# Patient Record
Sex: MALE | Race: WHITE | Employment: FULL TIME | URBAN - METROPOLITAN AREA
[De-identification: names, ages, dates, MRNs, and addresses within clinical notes are randomized per-mention and may not be internally consistent; named-entity substitution may affect disease eponyms.]

---

## 2020-01-16 ENCOUNTER — HOSPITAL ENCOUNTER (OUTPATIENT)
Dept: PREADMISSION TESTING | Age: 65
Discharge: HOME | End: 2020-01-16
Payer: COMMERCIAL

## 2020-01-16 VITALS
SYSTOLIC BLOOD PRESSURE: 156 MMHG | DIASTOLIC BLOOD PRESSURE: 79 MMHG | TEMPERATURE: 97.9 F | HEART RATE: 84 BPM | OXYGEN SATURATION: 98 % | HEIGHT: 67 IN | BODY MASS INDEX: 35.96 KG/M2 | WEIGHT: 229.13 LBS

## 2020-01-16 LAB
ABO + RH BLD: NORMAL
ANION GAP SERPL CALC-SCNC: 5 MMOL/L (ref 5–15)
APPEARANCE UR: CLEAR
ATRIAL RATE: 85 BPM
BACTERIA URNS QL MICRO: NEGATIVE /HPF
BILIRUB UR QL: NEGATIVE
BLOOD GROUP ANTIBODIES SERPL: NORMAL
BUN SERPL-MCNC: 28 MG/DL (ref 6–20)
BUN/CREAT SERPL: 31 (ref 12–20)
CALCIUM SERPL-MCNC: 10.2 MG/DL (ref 8.5–10.1)
CALCULATED P AXIS, ECG09: 39 DEGREES
CALCULATED R AXIS, ECG10: -22 DEGREES
CALCULATED T AXIS, ECG11: 39 DEGREES
CHLORIDE SERPL-SCNC: 106 MMOL/L (ref 97–108)
CO2 SERPL-SCNC: 28 MMOL/L (ref 21–32)
COLOR UR: ABNORMAL
CREAT SERPL-MCNC: 0.89 MG/DL (ref 0.7–1.3)
DIAGNOSIS, 93000: NORMAL
EPITH CASTS URNS QL MICRO: ABNORMAL /LPF
ERYTHROCYTE [DISTWIDTH] IN BLOOD BY AUTOMATED COUNT: 12.9 % (ref 11.5–14.5)
EST. AVERAGE GLUCOSE BLD GHB EST-MCNC: 166 MG/DL
GLUCOSE SERPL-MCNC: 196 MG/DL (ref 65–100)
GLUCOSE UR STRIP.AUTO-MCNC: 500 MG/DL
HBA1C MFR BLD: 7.4 % (ref 4–5.6)
HCT VFR BLD AUTO: 44.5 % (ref 36.6–50.3)
HGB BLD-MCNC: 14 G/DL (ref 12.1–17)
HGB UR QL STRIP: NEGATIVE
HYALINE CASTS URNS QL MICRO: ABNORMAL /LPF (ref 0–5)
INR PPP: 0.9 (ref 0.9–1.1)
KETONES UR QL STRIP.AUTO: ABNORMAL MG/DL
LEUKOCYTE ESTERASE UR QL STRIP.AUTO: NEGATIVE
MCH RBC QN AUTO: 29.3 PG (ref 26–34)
MCHC RBC AUTO-ENTMCNC: 31.5 G/DL (ref 30–36.5)
MCV RBC AUTO: 93.1 FL (ref 80–99)
NITRITE UR QL STRIP.AUTO: NEGATIVE
NRBC # BLD: 0 K/UL (ref 0–0.01)
NRBC BLD-RTO: 0 PER 100 WBC
P-R INTERVAL, ECG05: 210 MS
PH UR STRIP: 6.5 [PH] (ref 5–8)
PLATELET # BLD AUTO: 227 K/UL (ref 150–400)
PMV BLD AUTO: 10.8 FL (ref 8.9–12.9)
POTASSIUM SERPL-SCNC: 5.4 MMOL/L (ref 3.5–5.1)
PROT UR STRIP-MCNC: NEGATIVE MG/DL
PROTHROMBIN TIME: 9.5 SEC (ref 9–11.1)
Q-T INTERVAL, ECG07: 358 MS
QRS DURATION, ECG06: 82 MS
QTC CALCULATION (BEZET), ECG08: 426 MS
RBC # BLD AUTO: 4.78 M/UL (ref 4.1–5.7)
RBC #/AREA URNS HPF: ABNORMAL /HPF (ref 0–5)
SODIUM SERPL-SCNC: 139 MMOL/L (ref 136–145)
SP GR UR REFRACTOMETRY: 1.03 (ref 1–1.03)
SPECIMEN EXP DATE BLD: NORMAL
UA: UC IF INDICATED,UAUC: ABNORMAL
UROBILINOGEN UR QL STRIP.AUTO: 1 EU/DL (ref 0.2–1)
VENTRICULAR RATE, ECG03: 85 BPM
WBC # BLD AUTO: 6.2 K/UL (ref 4.1–11.1)
WBC URNS QL MICRO: ABNORMAL /HPF (ref 0–4)

## 2020-01-16 PROCEDURE — 85027 COMPLETE CBC AUTOMATED: CPT

## 2020-01-16 PROCEDURE — 85610 PROTHROMBIN TIME: CPT

## 2020-01-16 PROCEDURE — 86900 BLOOD TYPING SEROLOGIC ABO: CPT

## 2020-01-16 PROCEDURE — 83036 HEMOGLOBIN GLYCOSYLATED A1C: CPT

## 2020-01-16 PROCEDURE — 93005 ELECTROCARDIOGRAM TRACING: CPT

## 2020-01-16 PROCEDURE — 80048 BASIC METABOLIC PNL TOTAL CA: CPT

## 2020-01-16 PROCEDURE — 81001 URINALYSIS AUTO W/SCOPE: CPT

## 2020-01-16 RX ORDER — LOSARTAN POTASSIUM 100 MG/1
100 TABLET ORAL DAILY
COMMUNITY

## 2020-01-16 RX ORDER — SIMVASTATIN 40 MG/1
40 TABLET, FILM COATED ORAL
COMMUNITY

## 2020-01-16 RX ORDER — METFORMIN HYDROCHLORIDE 500 MG/1
1000 TABLET ORAL 2 TIMES DAILY WITH MEALS
COMMUNITY

## 2020-01-16 RX ORDER — NAPROXEN SODIUM 220 MG
880 TABLET ORAL
COMMUNITY
End: 2020-01-30

## 2020-01-16 RX ORDER — INSULIN DEGLUDEC 100 U/ML
16 INJECTION, SOLUTION SUBCUTANEOUS
COMMUNITY

## 2020-01-16 NOTE — PERIOP NOTES
Preoperative instructions reviewed with patient. Patient given two bottles CHG soap. Instructions  to be reviewed in class on use of CHG soap. Patient given SSI infection sheet. MRSA/MSSA treatment instruction sheet given with an explanation to patient that they  will be notified if treatment instructions need to be initiated. Patient was given the opportunity to ask questions on the information provided.

## 2020-01-17 LAB
BACTERIA SPEC CULT: NORMAL
BACTERIA SPEC CULT: NORMAL
SERVICE CMNT-IMP: NORMAL

## 2020-01-23 RX ORDER — ACETAMINOPHEN 500 MG
1000 TABLET ORAL ONCE
Status: CANCELLED | OUTPATIENT
Start: 2020-01-29 | End: 2020-01-29

## 2020-01-23 RX ORDER — DEXAMETHASONE SODIUM PHOSPHATE 10 MG/ML
4 INJECTION INTRAMUSCULAR; INTRAVENOUS ONCE
Status: CANCELLED | OUTPATIENT
Start: 2020-01-29 | End: 2020-01-29

## 2020-01-23 RX ORDER — CELECOXIB 200 MG/1
200 CAPSULE ORAL ONCE
Status: CANCELLED | OUTPATIENT
Start: 2020-01-29 | End: 2020-01-29

## 2020-01-23 RX ORDER — PREGABALIN 75 MG/1
75 CAPSULE ORAL ONCE
Status: CANCELLED | OUTPATIENT
Start: 2020-01-29 | End: 2020-01-29

## 2020-01-23 RX ORDER — CEFAZOLIN SODIUM/WATER 2 G/20 ML
2 SYRINGE (ML) INTRAVENOUS ONCE
Status: CANCELLED | OUTPATIENT
Start: 2020-01-29 | End: 2020-01-29

## 2020-01-29 ENCOUNTER — ANESTHESIA EVENT (OUTPATIENT)
Dept: SURGERY | Age: 65
End: 2020-01-29
Payer: COMMERCIAL

## 2020-01-29 ENCOUNTER — ANESTHESIA (OUTPATIENT)
Dept: SURGERY | Age: 65
End: 2020-01-29
Payer: COMMERCIAL

## 2020-01-29 ENCOUNTER — HOSPITAL ENCOUNTER (OUTPATIENT)
Age: 65
Setting detail: OBSERVATION
Discharge: HOME | End: 2020-01-30
Attending: ORTHOPAEDIC SURGERY | Admitting: ORTHOPAEDIC SURGERY
Payer: COMMERCIAL

## 2020-01-29 ENCOUNTER — APPOINTMENT (OUTPATIENT)
Dept: GENERAL RADIOLOGY | Age: 65
End: 2020-01-29
Attending: ORTHOPAEDIC SURGERY
Payer: COMMERCIAL

## 2020-01-29 DIAGNOSIS — M16.11 PRIMARY OSTEOARTHRITIS OF RIGHT HIP: Primary | ICD-10-CM

## 2020-01-29 LAB
GLUCOSE BLD STRIP.AUTO-MCNC: 156 MG/DL (ref 65–100)
GLUCOSE BLD STRIP.AUTO-MCNC: 160 MG/DL (ref 65–100)
GLUCOSE BLD STRIP.AUTO-MCNC: 226 MG/DL (ref 65–100)
GLUCOSE BLD STRIP.AUTO-MCNC: 267 MG/DL (ref 65–100)
SERVICE CMNT-IMP: ABNORMAL

## 2020-01-29 PROCEDURE — 74011000258 HC RX REV CODE- 258: Performed by: ORTHOPAEDIC SURGERY

## 2020-01-29 PROCEDURE — 74011000250 HC RX REV CODE- 250: Performed by: NURSE ANESTHETIST, CERTIFIED REGISTERED

## 2020-01-29 PROCEDURE — 97161 PT EVAL LOW COMPLEX 20 MIN: CPT

## 2020-01-29 PROCEDURE — 77030012935 HC DRSG AQUACEL BMS -B: Performed by: ORTHOPAEDIC SURGERY

## 2020-01-29 PROCEDURE — 77030018846 HC SOL IRR STRL H20 ICUM -A: Performed by: ORTHOPAEDIC SURGERY

## 2020-01-29 PROCEDURE — 77030020788: Performed by: ORTHOPAEDIC SURGERY

## 2020-01-29 PROCEDURE — 77030020268 HC MISC GENERAL SUPPLY: Performed by: ORTHOPAEDIC SURGERY

## 2020-01-29 PROCEDURE — 76010000171 HC OR TIME 2 TO 2.5 HR INTENSV-TIER 1: Performed by: ORTHOPAEDIC SURGERY

## 2020-01-29 PROCEDURE — 77030002933 HC SUT MCRYL J&J -A: Performed by: ORTHOPAEDIC SURGERY

## 2020-01-29 PROCEDURE — 77030010507 HC ADH SKN DERMBND J&J -B: Performed by: ORTHOPAEDIC SURGERY

## 2020-01-29 PROCEDURE — C1776 JOINT DEVICE (IMPLANTABLE): HCPCS | Performed by: ORTHOPAEDIC SURGERY

## 2020-01-29 PROCEDURE — 74011250637 HC RX REV CODE- 250/637: Performed by: ORTHOPAEDIC SURGERY

## 2020-01-29 PROCEDURE — 77030018836 HC SOL IRR NACL ICUM -A: Performed by: ORTHOPAEDIC SURGERY

## 2020-01-29 PROCEDURE — 65270000029 HC RM PRIVATE

## 2020-01-29 PROCEDURE — 77030019905 HC CATH URETH INTMIT MDII -A: Performed by: ORTHOPAEDIC SURGERY

## 2020-01-29 PROCEDURE — 73501 X-RAY EXAM HIP UNI 1 VIEW: CPT

## 2020-01-29 PROCEDURE — 77030018547 HC SUT ETHBND1 J&J -B: Performed by: ORTHOPAEDIC SURGERY

## 2020-01-29 PROCEDURE — 77030035236 HC SUT PDS STRATFX BARB J&J -B: Performed by: ORTHOPAEDIC SURGERY

## 2020-01-29 PROCEDURE — 77030007866 HC KT SPN ANES BBMI -B: Performed by: ANESTHESIOLOGY

## 2020-01-29 PROCEDURE — 77030010938 HC CLP LIG TELE -A: Performed by: ORTHOPAEDIC SURGERY

## 2020-01-29 PROCEDURE — 74011000250 HC RX REV CODE- 250: Performed by: ORTHOPAEDIC SURGERY

## 2020-01-29 PROCEDURE — 77030011640 HC PAD GRND REM COVD -A: Performed by: ORTHOPAEDIC SURGERY

## 2020-01-29 PROCEDURE — 74011250636 HC RX REV CODE- 250/636: Performed by: ANESTHESIOLOGY

## 2020-01-29 PROCEDURE — 76210000006 HC OR PH I REC 0.5 TO 1 HR: Performed by: ORTHOPAEDIC SURGERY

## 2020-01-29 PROCEDURE — 74011636637 HC RX REV CODE- 636/637: Performed by: ORTHOPAEDIC SURGERY

## 2020-01-29 PROCEDURE — 97116 GAIT TRAINING THERAPY: CPT

## 2020-01-29 PROCEDURE — C9290 INJ, BUPIVACAINE LIPOSOME: HCPCS | Performed by: ORTHOPAEDIC SURGERY

## 2020-01-29 PROCEDURE — 77030036660

## 2020-01-29 PROCEDURE — 77030011264 HC ELECTRD BLD EXT COVD -A: Performed by: ORTHOPAEDIC SURGERY

## 2020-01-29 PROCEDURE — 74011250636 HC RX REV CODE- 250/636: Performed by: NURSE ANESTHETIST, CERTIFIED REGISTERED

## 2020-01-29 PROCEDURE — 76060000035 HC ANESTHESIA 2 TO 2.5 HR: Performed by: ORTHOPAEDIC SURGERY

## 2020-01-29 PROCEDURE — 77030005513 HC CATH URETH FOL11 MDII -B: Performed by: ORTHOPAEDIC SURGERY

## 2020-01-29 PROCEDURE — 99218 HC RM OBSERVATION: CPT

## 2020-01-29 PROCEDURE — 77030040922 HC BLNKT HYPOTHRM STRY -A

## 2020-01-29 PROCEDURE — 74011250636 HC RX REV CODE- 250/636: Performed by: ORTHOPAEDIC SURGERY

## 2020-01-29 PROCEDURE — 77030006802 HC BLD SAW RECIP BRSM -B: Performed by: ORTHOPAEDIC SURGERY

## 2020-01-29 PROCEDURE — 77030027138 HC INCENT SPIROMETER -A

## 2020-01-29 PROCEDURE — 77030031139 HC SUT VCRL2 J&J -A: Performed by: ORTHOPAEDIC SURGERY

## 2020-01-29 PROCEDURE — 82962 GLUCOSE BLOOD TEST: CPT

## 2020-01-29 PROCEDURE — 97110 THERAPEUTIC EXERCISES: CPT

## 2020-01-29 DEVICE — PINNACLE GRIPTION ACETABULAR SHELL SECTOR 58MM OD
Type: IMPLANTABLE DEVICE | Site: HIP | Status: FUNCTIONAL
Brand: PINNACLE GRIPTION

## 2020-01-29 DEVICE — PINNACLE CANCELLOUS BONE SCREW 6.5MM X 35MM
Type: IMPLANTABLE DEVICE | Site: HIP | Status: FUNCTIONAL
Brand: PINNACLE

## 2020-01-29 DEVICE — COMPONENT TOT HIP PRIMARY CERM ALTRX: Type: IMPLANTABLE DEVICE | Status: FUNCTIONAL

## 2020-01-29 DEVICE — BIOLOX DELTA CERAMIC FEMORAL HEAD +5.0 36MM DIA 12/14 TAPER
Type: IMPLANTABLE DEVICE | Site: HIP | Status: FUNCTIONAL
Brand: BIOLOX DELTA

## 2020-01-29 DEVICE — ACTIS DUOFIX HIP PROSTHESIS (FEMORAL STEM 12/14 TAPER CEMENTLESS SIZE 8 STD COLLAR)  CE
Type: IMPLANTABLE DEVICE | Site: HIP | Status: FUNCTIONAL
Brand: ACTIS

## 2020-01-29 DEVICE — PINNACLE HIP SOLUTIONS ALTRX POLYETHYLENE ACETABULAR LINER NEUTRAL 36MM ID 58MM OD
Type: IMPLANTABLE DEVICE | Site: HIP | Status: FUNCTIONAL
Brand: PINNACLE ALTRX

## 2020-01-29 DEVICE — PINNACLE CANCELLOUS BONE SCREW 6.5MM X 25MM
Type: IMPLANTABLE DEVICE | Site: HIP | Status: FUNCTIONAL
Brand: PINNACLE

## 2020-01-29 RX ORDER — FAMOTIDINE 20 MG/1
20 TABLET, FILM COATED ORAL 2 TIMES DAILY
Qty: 60 TAB | Refills: 0 | Status: SHIPPED | OUTPATIENT
Start: 2020-01-29

## 2020-01-29 RX ORDER — POLYETHYLENE GLYCOL 3350 17 G/17G
17 POWDER, FOR SOLUTION ORAL DAILY
Status: DISCONTINUED | OUTPATIENT
Start: 2020-01-30 | End: 2020-01-30 | Stop reason: HOSPADM

## 2020-01-29 RX ORDER — HYDROMORPHONE HYDROCHLORIDE 1 MG/ML
0.5 INJECTION, SOLUTION INTRAMUSCULAR; INTRAVENOUS; SUBCUTANEOUS
Status: ACTIVE | OUTPATIENT
Start: 2020-01-29 | End: 2020-01-30

## 2020-01-29 RX ORDER — NALOXONE HYDROCHLORIDE 0.4 MG/ML
0.4 INJECTION, SOLUTION INTRAMUSCULAR; INTRAVENOUS; SUBCUTANEOUS AS NEEDED
Status: DISCONTINUED | OUTPATIENT
Start: 2020-01-29 | End: 2020-01-30 | Stop reason: HOSPADM

## 2020-01-29 RX ORDER — SIMVASTATIN 40 MG/1
40 TABLET, FILM COATED ORAL
Status: DISCONTINUED | OUTPATIENT
Start: 2020-01-30 | End: 2020-01-30 | Stop reason: HOSPADM

## 2020-01-29 RX ORDER — TRAMADOL HYDROCHLORIDE 50 MG/1
50 TABLET ORAL
Qty: 60 TAB | Refills: 0 | Status: SHIPPED | OUTPATIENT
Start: 2020-01-29 | End: 2020-02-28

## 2020-01-29 RX ORDER — FAMOTIDINE 20 MG/1
20 TABLET, FILM COATED ORAL 2 TIMES DAILY
Status: DISCONTINUED | OUTPATIENT
Start: 2020-01-29 | End: 2020-01-30 | Stop reason: HOSPADM

## 2020-01-29 RX ORDER — TRAMADOL HYDROCHLORIDE 50 MG/1
50 TABLET ORAL
Status: DISCONTINUED | OUTPATIENT
Start: 2020-01-29 | End: 2020-01-30 | Stop reason: HOSPADM

## 2020-01-29 RX ORDER — MAGNESIUM SULFATE 100 %
4 CRYSTALS MISCELLANEOUS AS NEEDED
Status: DISCONTINUED | OUTPATIENT
Start: 2020-01-29 | End: 2020-01-30 | Stop reason: HOSPADM

## 2020-01-29 RX ORDER — SODIUM CHLORIDE 0.9 % (FLUSH) 0.9 %
5-40 SYRINGE (ML) INJECTION EVERY 8 HOURS
Status: DISCONTINUED | OUTPATIENT
Start: 2020-01-29 | End: 2020-01-30 | Stop reason: HOSPADM

## 2020-01-29 RX ORDER — ACETAMINOPHEN 325 MG/1
650 TABLET ORAL EVERY 6 HOURS
Status: DISCONTINUED | OUTPATIENT
Start: 2020-01-29 | End: 2020-01-30 | Stop reason: HOSPADM

## 2020-01-29 RX ORDER — KETOROLAC TROMETHAMINE 30 MG/ML
15 INJECTION, SOLUTION INTRAMUSCULAR; INTRAVENOUS EVERY 6 HOURS
Status: COMPLETED | OUTPATIENT
Start: 2020-01-29 | End: 2020-01-30

## 2020-01-29 RX ORDER — FENTANYL CITRATE 50 UG/ML
50 INJECTION, SOLUTION INTRAMUSCULAR; INTRAVENOUS AS NEEDED
Status: DISCONTINUED | OUTPATIENT
Start: 2020-01-29 | End: 2020-01-29 | Stop reason: HOSPADM

## 2020-01-29 RX ORDER — ASPIRIN 81 MG/1
81 TABLET ORAL 2 TIMES DAILY
Qty: 60 TAB | Refills: 0 | Status: SHIPPED | OUTPATIENT
Start: 2020-01-29

## 2020-01-29 RX ORDER — INSULIN LISPRO 100 [IU]/ML
INJECTION, SOLUTION INTRAVENOUS; SUBCUTANEOUS
Status: DISCONTINUED | OUTPATIENT
Start: 2020-01-29 | End: 2020-01-30 | Stop reason: HOSPADM

## 2020-01-29 RX ORDER — SODIUM CHLORIDE 0.9 % (FLUSH) 0.9 %
5-40 SYRINGE (ML) INJECTION EVERY 8 HOURS
Status: DISCONTINUED | OUTPATIENT
Start: 2020-01-29 | End: 2020-01-29 | Stop reason: HOSPADM

## 2020-01-29 RX ORDER — PHENYLEPHRINE HCL IN 0.9% NACL 0.4MG/10ML
SYRINGE (ML) INTRAVENOUS AS NEEDED
Status: DISCONTINUED | OUTPATIENT
Start: 2020-01-29 | End: 2020-01-29 | Stop reason: HOSPADM

## 2020-01-29 RX ORDER — SODIUM CHLORIDE, SODIUM LACTATE, POTASSIUM CHLORIDE, CALCIUM CHLORIDE 600; 310; 30; 20 MG/100ML; MG/100ML; MG/100ML; MG/100ML
75 INJECTION, SOLUTION INTRAVENOUS CONTINUOUS
Status: DISCONTINUED | OUTPATIENT
Start: 2020-01-29 | End: 2020-01-29 | Stop reason: HOSPADM

## 2020-01-29 RX ORDER — LOSARTAN POTASSIUM 50 MG/1
100 TABLET ORAL DAILY
Status: DISCONTINUED | OUTPATIENT
Start: 2020-01-30 | End: 2020-01-30 | Stop reason: HOSPADM

## 2020-01-29 RX ORDER — OXYCODONE HYDROCHLORIDE 5 MG/1
5 TABLET ORAL
Qty: 60 TAB | Refills: 0 | Status: SHIPPED | OUTPATIENT
Start: 2020-01-29 | End: 2020-01-30

## 2020-01-29 RX ORDER — PROPOFOL 10 MG/ML
INJECTION, EMULSION INTRAVENOUS AS NEEDED
Status: DISCONTINUED | OUTPATIENT
Start: 2020-01-29 | End: 2020-01-29 | Stop reason: HOSPADM

## 2020-01-29 RX ORDER — SODIUM CHLORIDE 0.9 % (FLUSH) 0.9 %
5-40 SYRINGE (ML) INJECTION AS NEEDED
Status: DISCONTINUED | OUTPATIENT
Start: 2020-01-29 | End: 2020-01-29 | Stop reason: HOSPADM

## 2020-01-29 RX ORDER — CEFAZOLIN SODIUM/WATER 2 G/20 ML
2 SYRINGE (ML) INTRAVENOUS EVERY 8 HOURS
Status: COMPLETED | OUTPATIENT
Start: 2020-01-29 | End: 2020-01-30

## 2020-01-29 RX ORDER — ACETAMINOPHEN 500 MG
1000 TABLET ORAL ONCE
Status: COMPLETED | OUTPATIENT
Start: 2020-01-29 | End: 2020-01-29

## 2020-01-29 RX ORDER — CELECOXIB 200 MG/1
200 CAPSULE ORAL ONCE
Status: COMPLETED | OUTPATIENT
Start: 2020-01-29 | End: 2020-01-29

## 2020-01-29 RX ORDER — FACIAL-BODY WIPES
10 EACH TOPICAL DAILY PRN
Status: DISCONTINUED | OUTPATIENT
Start: 2020-01-30 | End: 2020-01-30 | Stop reason: HOSPADM

## 2020-01-29 RX ORDER — INSULIN GLARGINE 100 [IU]/ML
16 INJECTION, SOLUTION SUBCUTANEOUS
Status: DISCONTINUED | OUTPATIENT
Start: 2020-01-29 | End: 2020-01-30 | Stop reason: HOSPADM

## 2020-01-29 RX ORDER — AMOXICILLIN 250 MG
1 CAPSULE ORAL 2 TIMES DAILY
Status: DISCONTINUED | OUTPATIENT
Start: 2020-01-29 | End: 2020-01-30 | Stop reason: HOSPADM

## 2020-01-29 RX ORDER — OXYCODONE HYDROCHLORIDE 5 MG/1
5 TABLET ORAL
Status: DISCONTINUED | OUTPATIENT
Start: 2020-01-29 | End: 2020-01-30 | Stop reason: HOSPADM

## 2020-01-29 RX ORDER — MELOXICAM 7.5 MG/1
7.5 TABLET ORAL DAILY
Qty: 60 TAB | Refills: 0 | Status: SHIPPED | OUTPATIENT
Start: 2020-01-29

## 2020-01-29 RX ORDER — HYDROMORPHONE HYDROCHLORIDE 1 MG/ML
0.2 INJECTION, SOLUTION INTRAMUSCULAR; INTRAVENOUS; SUBCUTANEOUS
Status: DISCONTINUED | OUTPATIENT
Start: 2020-01-29 | End: 2020-01-29 | Stop reason: HOSPADM

## 2020-01-29 RX ORDER — SODIUM CHLORIDE 9 MG/ML
50 INJECTION, SOLUTION INTRAVENOUS CONTINUOUS
Status: DISCONTINUED | OUTPATIENT
Start: 2020-01-29 | End: 2020-01-29 | Stop reason: HOSPADM

## 2020-01-29 RX ORDER — DEXAMETHASONE SODIUM PHOSPHATE 10 MG/ML
4 INJECTION INTRAMUSCULAR; INTRAVENOUS ONCE
Status: COMPLETED | OUTPATIENT
Start: 2020-01-29 | End: 2020-01-29

## 2020-01-29 RX ORDER — ASPIRIN 81 MG/1
81 TABLET ORAL 2 TIMES DAILY
Status: DISCONTINUED | OUTPATIENT
Start: 2020-01-29 | End: 2020-01-30 | Stop reason: HOSPADM

## 2020-01-29 RX ORDER — MIDAZOLAM HYDROCHLORIDE 1 MG/ML
0.5 INJECTION, SOLUTION INTRAMUSCULAR; INTRAVENOUS
Status: DISCONTINUED | OUTPATIENT
Start: 2020-01-29 | End: 2020-01-29 | Stop reason: HOSPADM

## 2020-01-29 RX ORDER — MORPHINE SULFATE 10 MG/ML
2 INJECTION, SOLUTION INTRAMUSCULAR; INTRAVENOUS
Status: DISCONTINUED | OUTPATIENT
Start: 2020-01-29 | End: 2020-01-29 | Stop reason: HOSPADM

## 2020-01-29 RX ORDER — SODIUM CHLORIDE 0.9 % (FLUSH) 0.9 %
5-40 SYRINGE (ML) INJECTION AS NEEDED
Status: DISCONTINUED | OUTPATIENT
Start: 2020-01-29 | End: 2020-01-30 | Stop reason: HOSPADM

## 2020-01-29 RX ORDER — BUPIVACAINE HYDROCHLORIDE 5 MG/ML
INJECTION, SOLUTION EPIDURAL; INTRACAUDAL AS NEEDED
Status: DISCONTINUED | OUTPATIENT
Start: 2020-01-29 | End: 2020-01-29 | Stop reason: HOSPADM

## 2020-01-29 RX ORDER — ONDANSETRON 2 MG/ML
4 INJECTION INTRAMUSCULAR; INTRAVENOUS AS NEEDED
Status: DISCONTINUED | OUTPATIENT
Start: 2020-01-29 | End: 2020-01-29 | Stop reason: HOSPADM

## 2020-01-29 RX ORDER — METFORMIN HYDROCHLORIDE 500 MG/1
1000 TABLET ORAL 2 TIMES DAILY WITH MEALS
Status: DISCONTINUED | OUTPATIENT
Start: 2020-01-30 | End: 2020-01-30 | Stop reason: HOSPADM

## 2020-01-29 RX ORDER — FENTANYL CITRATE 50 UG/ML
25 INJECTION, SOLUTION INTRAMUSCULAR; INTRAVENOUS
Status: DISCONTINUED | OUTPATIENT
Start: 2020-01-29 | End: 2020-01-29 | Stop reason: HOSPADM

## 2020-01-29 RX ORDER — OXYCODONE AND ACETAMINOPHEN 5; 325 MG/1; MG/1
1 TABLET ORAL AS NEEDED
Status: DISCONTINUED | OUTPATIENT
Start: 2020-01-29 | End: 2020-01-29 | Stop reason: HOSPADM

## 2020-01-29 RX ORDER — PROPOFOL 10 MG/ML
INJECTION, EMULSION INTRAVENOUS
Status: DISCONTINUED | OUTPATIENT
Start: 2020-01-29 | End: 2020-01-29 | Stop reason: HOSPADM

## 2020-01-29 RX ORDER — DEXTROSE MONOHYDRATE 100 MG/ML
0-250 INJECTION, SOLUTION INTRAVENOUS AS NEEDED
Status: DISCONTINUED | OUTPATIENT
Start: 2020-01-29 | End: 2020-01-30 | Stop reason: HOSPADM

## 2020-01-29 RX ORDER — CEFAZOLIN SODIUM/WATER 2 G/20 ML
2 SYRINGE (ML) INTRAVENOUS ONCE
Status: COMPLETED | OUTPATIENT
Start: 2020-01-29 | End: 2020-01-29

## 2020-01-29 RX ORDER — MIDAZOLAM HYDROCHLORIDE 1 MG/ML
INJECTION, SOLUTION INTRAMUSCULAR; INTRAVENOUS AS NEEDED
Status: DISCONTINUED | OUTPATIENT
Start: 2020-01-29 | End: 2020-01-29 | Stop reason: HOSPADM

## 2020-01-29 RX ORDER — DIPHENHYDRAMINE HYDROCHLORIDE 50 MG/ML
12.5 INJECTION, SOLUTION INTRAMUSCULAR; INTRAVENOUS AS NEEDED
Status: DISCONTINUED | OUTPATIENT
Start: 2020-01-29 | End: 2020-01-29 | Stop reason: HOSPADM

## 2020-01-29 RX ORDER — HYDROXYZINE HYDROCHLORIDE 10 MG/1
10 TABLET, FILM COATED ORAL
Status: DISCONTINUED | OUTPATIENT
Start: 2020-01-29 | End: 2020-01-30 | Stop reason: HOSPADM

## 2020-01-29 RX ORDER — MIDAZOLAM HYDROCHLORIDE 1 MG/ML
1 INJECTION, SOLUTION INTRAMUSCULAR; INTRAVENOUS AS NEEDED
Status: DISCONTINUED | OUTPATIENT
Start: 2020-01-29 | End: 2020-01-29 | Stop reason: HOSPADM

## 2020-01-29 RX ORDER — LIDOCAINE HYDROCHLORIDE 10 MG/ML
0.1 INJECTION, SOLUTION EPIDURAL; INFILTRATION; INTRACAUDAL; PERINEURAL AS NEEDED
Status: DISCONTINUED | OUTPATIENT
Start: 2020-01-29 | End: 2020-01-29 | Stop reason: HOSPADM

## 2020-01-29 RX ORDER — PREGABALIN 75 MG/1
75 CAPSULE ORAL ONCE
Status: COMPLETED | OUTPATIENT
Start: 2020-01-29 | End: 2020-01-29

## 2020-01-29 RX ORDER — SODIUM CHLORIDE 9 MG/ML
125 INJECTION, SOLUTION INTRAVENOUS CONTINUOUS
Status: DISPENSED | OUTPATIENT
Start: 2020-01-29 | End: 2020-01-30

## 2020-01-29 RX ORDER — ONDANSETRON 2 MG/ML
4 INJECTION INTRAMUSCULAR; INTRAVENOUS
Status: ACTIVE | OUTPATIENT
Start: 2020-01-29 | End: 2020-01-30

## 2020-01-29 RX ADMIN — FAMOTIDINE 20 MG: 20 TABLET ORAL at 17:08

## 2020-01-29 RX ADMIN — ACETAMINOPHEN 650 MG: 325 TABLET ORAL at 19:27

## 2020-01-29 RX ADMIN — MIDAZOLAM HYDROCHLORIDE 3 MG: 1 INJECTION, SOLUTION INTRAMUSCULAR; INTRAVENOUS at 09:09

## 2020-01-29 RX ADMIN — CELECOXIB 200 MG: 200 CAPSULE ORAL at 07:59

## 2020-01-29 RX ADMIN — Medication 120 MCG: at 10:04

## 2020-01-29 RX ADMIN — SODIUM CHLORIDE, SODIUM LACTATE, POTASSIUM CHLORIDE, AND CALCIUM CHLORIDE 75 ML/HR: 600; 310; 30; 20 INJECTION, SOLUTION INTRAVENOUS at 08:17

## 2020-01-29 RX ADMIN — INSULIN LISPRO 2 UNITS: 100 INJECTION, SOLUTION INTRAVENOUS; SUBCUTANEOUS at 12:00

## 2020-01-29 RX ADMIN — Medication 10 ML: at 14:35

## 2020-01-29 RX ADMIN — SODIUM CHLORIDE 125 ML/HR: 900 INJECTION, SOLUTION INTRAVENOUS at 13:04

## 2020-01-29 RX ADMIN — ACETAMINOPHEN 650 MG: 325 TABLET ORAL at 14:34

## 2020-01-29 RX ADMIN — INSULIN LISPRO 5 UNITS: 100 INJECTION, SOLUTION INTRAVENOUS; SUBCUTANEOUS at 17:08

## 2020-01-29 RX ADMIN — Medication 80 MCG: at 09:42

## 2020-01-29 RX ADMIN — Medication 2 G: at 18:04

## 2020-01-29 RX ADMIN — Medication 2 G: at 09:30

## 2020-01-29 RX ADMIN — PROPOFOL 30 MG: 10 INJECTION, EMULSION INTRAVENOUS at 10:18

## 2020-01-29 RX ADMIN — PROPOFOL 25 MG: 10 INJECTION, EMULSION INTRAVENOUS at 09:37

## 2020-01-29 RX ADMIN — KETOROLAC TROMETHAMINE 15 MG: 30 INJECTION, SOLUTION INTRAMUSCULAR at 18:04

## 2020-01-29 RX ADMIN — Medication 10 ML: at 22:23

## 2020-01-29 RX ADMIN — MIDAZOLAM HYDROCHLORIDE 2 MG: 1 INJECTION, SOLUTION INTRAMUSCULAR; INTRAVENOUS at 09:12

## 2020-01-29 RX ADMIN — TRAMADOL HYDROCHLORIDE 50 MG: 50 TABLET, FILM COATED ORAL at 17:08

## 2020-01-29 RX ADMIN — ASPIRIN 81 MG: 81 TABLET, COATED ORAL at 17:08

## 2020-01-29 RX ADMIN — SODIUM CHLORIDE 125 ML/HR: 900 INJECTION, SOLUTION INTRAVENOUS at 21:25

## 2020-01-29 RX ADMIN — INSULIN GLARGINE 16 UNITS: 100 INJECTION, SOLUTION SUBCUTANEOUS at 22:22

## 2020-01-29 RX ADMIN — PROPOFOL: 10 INJECTION, EMULSION INTRAVENOUS at 10:42

## 2020-01-29 RX ADMIN — DEXAMETHASONE SODIUM PHOSPHATE 4 MG: 10 INJECTION, SOLUTION INTRAMUSCULAR; INTRAVENOUS at 09:54

## 2020-01-29 RX ADMIN — PROPOFOL 25 MG: 10 INJECTION, EMULSION INTRAVENOUS at 09:34

## 2020-01-29 RX ADMIN — SODIUM CHLORIDE: 900 INJECTION, SOLUTION INTRAVENOUS at 10:55

## 2020-01-29 RX ADMIN — BUPIVACAINE HYDROCHLORIDE 11 MG: 5 INJECTION, SOLUTION EPIDURAL; INTRACAUDAL; PERINEURAL at 09:18

## 2020-01-29 RX ADMIN — Medication 80 MCG: at 09:54

## 2020-01-29 RX ADMIN — ACETAMINOPHEN 1000 MG: 500 TABLET ORAL at 08:00

## 2020-01-29 RX ADMIN — Medication 10 ML: at 18:05

## 2020-01-29 RX ADMIN — PHENYLEPHRINE HYDROCHLORIDE 80 MCG/MIN: 10 INJECTION INTRAVENOUS at 10:05

## 2020-01-29 RX ADMIN — Medication 120 MCG: at 09:31

## 2020-01-29 RX ADMIN — TRANEXAMIC ACID 1 G: 100 INJECTION, SOLUTION INTRAVENOUS at 09:35

## 2020-01-29 RX ADMIN — PREGABALIN 75 MG: 75 CAPSULE ORAL at 08:00

## 2020-01-29 RX ADMIN — PROPOFOL 50 MCG/KG/MIN: 10 INJECTION, EMULSION INTRAVENOUS at 09:15

## 2020-01-29 RX ADMIN — Medication 80 MCG: at 10:23

## 2020-01-29 RX ADMIN — Medication 120 MCG: at 09:45

## 2020-01-29 NOTE — PROGRESS NOTES
Ortho Post-Op Note    1/29/2020  2:35 PM    POD:  Day of Surgery  S/P:  Procedure(s):  RIGHT TOTAL HIP ARTHROPLASTY, ANTERIOR APPROACH    Afebrile/VSS ,NAD,A&O x 3  Doing well without complaints of nausea  Pain well controlled  Calves soft/NTTP Bilaterally  Thigh soft. Dressing clean and dry  Moving lower extremities well. Neurocirculatory exam intact and within normal range. T2DM- Corrective lispro SSI with Metformin 1000 mg starting tomorrow   Lab Results   Component Value Date/Time    HGB 14.0 01/16/2020 08:43 AM    INR 0.9 01/16/2020 08:43 AM     Recent Labs     01/16/20  0843   CREA 0.89   BUN 28*     Estimated Creatinine Clearance: 98.8 mL/min (by C-G formula based on SCr of 0.89 mg/dL).     PLAN:  DVT prophylaxis: ASA 81 mg bid  WBAT with PT-mobilization  Pain Control: Tylenol, toradol, oxycodone, tramadol  Plan to D/C home with HH/PT possibly tomorrow      SHEELA Dee

## 2020-01-29 NOTE — ANESTHESIA PROCEDURE NOTES
Spinal Block    Performed by: Makenzie Spence DO  Authorized by: Makenzie Spence DO     Pre-procedure:   Indications: at surgeon's request and primary anesthetic  Preanesthetic Checklist: patient identified, risks and benefits discussed, anesthesia consent, site marked, patient being monitored and timeout performed      Spinal Block:   Patient Position:  Seated    Prep: Betadine      Location:  L3-4  Technique:  Single shot        Needle:   Needle Type:  Pencil-tip  Needle Gauge:  25 G  Attempts:  1      Events: CSF confirmed, no blood with aspiration and no paresthesia        Assessment:  Insertion:  Uncomplicated  Patient tolerance:  Patient tolerated the procedure well with no immediate complications

## 2020-01-29 NOTE — H&P
Date of Surgery Update:  Yahaira Ovalles was seen and examined. History and physical has been reviewed. The patient has been examined. There have been no significant clinical changes since the completion of the originally dated History and Physical.  Patient identified by surgeon; surgical site was confirmed by patient and surgeon.     Signed By: Collette Fillers, MD     January 29, 2020 7:55 AM

## 2020-01-29 NOTE — PROGRESS NOTES
TRANSFER - IN REPORT:    Verbal report received from Neo(name) on Catarino Zheng  being received from [facility]) for routine post - op      Report consisted of patients Situation, Background, Assessment and   Recommendations(SBAR). Information from the following report(s) SBAR was reviewed with the receiving nurse. Opportunity for questions and clarification was provided. Assessment completed upon patients arrival to unit and care assumed.

## 2020-01-29 NOTE — OP NOTES
OPERATIVE REPORT  RIGHT TOTAL HIP REPLACEMENT (ANTERIOR APPROACH)    NAME: Esme Walden  MRN: [id number]  :  1955  AGE: 60 y.o. DATE OF SURGERY:  2020    PREOPERATIVE DIAGNOSIS: Severe degenerative joint disease, right hip. POSTOPERATIVE DIAGNOSIS: Severe degenerative joint disease, right hip. PROCEDURES PERFORMED: Right total hip replacement - Anterior approach    SURGEON: Concha Gongora MD.    Shelli Garland    ANESTHESIA: epidural/spinal anesthesia    ESTIMATED BLOOD LOSS: 250 mL. DRAINS: None. COMPLICATIONS: None. SPECIMENS REMOVED: None. PRE-OP ANTIBIOTIC: Ancef 2 gram    IMPLANT:   Implant Name Type Inv. Item Serial No.  Lot No. LRB No. Used Action   CUP ACET SECTOR GRIPTION 58MM -- TI - SN/A  CUP ACET SECTOR GRIPTION 58MM -- TI N/A [id number] Right 1 Implanted   LINER ACET PINN NEUT [id number] -- ALTRX - SN/A  LINER ACET PINN NEUT [id number] -- ALTRX N/A [facility] [employer] [id number] Right 1 Implanted   SCREW BONE FEM CANC 6. [id number] - SN/A  SCREW BONE FEM CANC 6. [id number] N/A [facility] [employer] [id number] Right 1 Implanted   SCR BNE ACET CANC PINN 6.5X35 -- SS - SN/A  SCR BNE ACET CANC PINN 6.5X35 -- SS N/A [facility] [employer] [id number] Right 1 Implanted   STEM FEM TAPR SZ8 STD OFFSET -- ACTIS - SN/A  STEM FEM TAPR SZ8 STD OFFSET -- ACTIS N/A [facility] [employer] [id number] Right 1 Implanted   HEAD FEM 36 ARTICL/EZ+5 BIOLOX - SN/A  HEAD FEM 36 ARTICL/EZ+5 BIOLOX N/A [facility] [employer] [id number] Right 1 Implanted       INDICATIONS: The patient is an 60 yrs male with progressive debilitating right hip and groin pain due to progressive osteoarthritis. Symptoms have progressed despite comprehensive conservative treatment and they present for right total hip replacement. Risks include bleeding, infection, damage to the LFCN, leg length descrepency, blood clots, pulmonary embolism, and death.  The patient understood these risks as well as potential benefits and alternatives and elected to proceed. DESCRIPTION OF PROCEDURE: Anesthetic was initiated. Preoperative dose of intravenous antibiotic was given within 30 minutes of incision. One gram of tranexamic acid was also administered intravenously. 2 grams of tranexamic acid with 0.4mL of epi topically at the end of the case. The right side was confirmed during a timeout and the hip was prepped and draped in the usual sterile fashion. Skin was covered with Ioban occlusive dressing. The patient underwent straight catheterization at the end of the case. Direct anterior exposure was made to the patient's hip through the sartorius-tensor interval well lateral of the ASIS to protect the LFCN. Anterior hip vasculature including the ascending branches of the lateral circumflex artery were cauterized. Retractors were taken out to observe for bleeding and there was none. A T capsulotomy was made with bovie electrocautery. The Charnley retractor was repositioned for exposure. The femoral neck was osteotomized. Femoral head was removed from the acetabulum, which was exposed and soft tissues were removed. The acetabulum was progressively  reamedand a 58 mm trial shell was impacted with good press-fit. This was removed and a 58 mm Raton Gription shell was impacted in the acetabulum in 40 degrees of abduction in an anatomic-type anteversion. Bone spurs were removed and 6.5 screws x2 were placed. The polyethylene liner was placed. Femur was positioned and elevated from the wound. Appropriate soft tissue releases were made including the posterior capsule and obturator internus. The medullary canal was entered with a box osteotome. The femoral canal was broached to a size 8. Calcar planed and then trialed. A 36 mm , +5 hip ball was the most appropriate for leg length and tension with offset to best match native offset. The hip was dislocated. The trial was removed and the real stem was impacted.  The real hip ball was placed. The hip was reduced. After copious irrigation, the capsule was closed with #1 Stratafix barbed sutures. I irrigated the skin, subcutaneous and deep wound. I closed the fascia of the tensor fascia mariano with #1 stratafix barbed sutures. Skin and subcutaneous were irrigated. Soft tissues were infiltrated with local anesthetic. 2 grams of tranexamic acid with 0.4 mL of epi given topically in the wound. Dilute betadine (17mL:1000mL of saline) was used to irrigate the wound followed by a rinse with the pulse lavage with normal saline. Skin and subcutaneous were closed in a standard fashion with 2-0 vicryl and 3-0 monocryl followed by Dermabond. An aquacel dressing was applied. There were no complications. No specimen was sent. The procedure was a RIGHT TOTAL HIP REPLACEMENT using a Depuy total hip construct. The patient was transferred to the recovery room in stable condition. An AP pelvis xray was ordered to be completed in the PACU.      Rosalio Morton MD

## 2020-01-29 NOTE — PROGRESS NOTES
Problem: Mobility Impaired (Adult and Pediatric)  Goal: *Acute Goals and Plan of Care (Insert Text)  Description  FUNCTIONAL STATUS PRIOR TO ADMISSION: Patient was independent and active without use of DME.    HOME SUPPORT PRIOR TO ADMISSION: The patient lived with wife but did not require assist.    Physical Therapy Goals  Initiated 1/29/2020    1. Patient will move from supine to sit and sit to supine  in bed with modified independence within 4 days. 2. Patient will perform sit to stand with minimal assistance/contact guard assist within 4 days. 3. Patient will ambulate with minimal assistance/contact guard assist for 150 feet with the least restrictive device within 4 days. 4. Patient will ascend/descend 2 stairs with bilateral handrail(s) with minimal assistance/contact guard assist within 4 days. 5. Patient will perform ELIANA home exercise program per protocol with independence within 4 days. PHYSICAL THERAPY EVALUATION  Patient: Dominique Martinez (24 y.o. male)  Date: 1/29/2020  Primary Diagnosis: Primary osteoarthritis of right hip [M16.11]  Procedure(s) (LRB):  RIGHT TOTAL HIP ARTHROPLASTY, ANTERIOR APPROACH (Right) Day of Surgery   Precautions: Fall        ASSESSMENT  Based on the objective data described below, the patient presents with pain, decreased LE strength, gait impairments and decreased functional mobility s/p R THR. Pt BP stable throughout assessment. Pt R LE demonstrates buckling during ambulation w/ RW. Pt requires verbal and tactile cueing in order to manage R LE w/ assist of L LE during sit to supine transfer. Pt instructed in ankle pumps and incentive spirometry every hour. Pt will benefit from skilled therapy in order to address above impairments and return to PLOF. Current Level of Function Impacting Discharge (mobility/balance): min assist x 1 for bed mobility, CGA x 2 for ambulation     Functional Outcome Measure:   The patient scored 60/100 on the Barthel outcome measure which is indicative of 40% disability in independence w/ ADLs. Other factors to consider for discharge: lives w/ wife, previously independent w/ ADLs      Patient will benefit from skilled therapy intervention to address the above noted impairments. PLAN :  Recommendations and Planned Interventions: transfer training, gait training, therapeutic exercises, patient and family training/education, and therapeutic activities      Frequency/Duration: Patient will be followed by physical therapy:  twice daily to address goals. Recommendation for discharge: (in order for the patient to meet his/her long term goals)  Physical therapy at least 2 days/week in the home     This discharge recommendation:  Has been made in collaboration with the attending provider and/or case management    IF patient discharges home will need the following DME: patient owns DME required for discharge         SUBJECTIVE:   Patient stated Sherman Stable went to the class.  -referring to joint class    OBJECTIVE DATA SUMMARY:   HISTORY:    Past Medical History:   Diagnosis Date    Arthritis     Chronic pain     RIGHT HIP    Diabetes ([facility] [address]     IDDM    Hypertension      Past Surgical History:   Procedure Laterality Date    HX COLONOSCOPY  2014    HX KNEE ARTHROSCOPY Right 1980'S       Personal factors and/or comorbidities impacting plan of care: HTN, DM     Home Situation  Home Environment: Private residence  # Steps to Enter:  2(to enter daughters house )  Rails to Enter: Yes  Hand Rails : Bilateral  One/Two Story Residence: One story  Living Alone: No  Support Systems: Spouse/Significant Other/Partner, Child(kim)(staying w/ daughter after hospital course ) - patient plans on staying at daughter's house for several days in her first floor bedroom in order to recover before going home   Patient Expects to be Discharged to[de-identified] Private residence  Current DME Used/Available at Home: [employer], [address], straight    EXAMINATION/PRESENTATION/DECISION MAKING: Critical Behavior:  Neurologic State: Appropriate for age, Alert  Orientation Level: Oriented X4  Cognition: Appropriate decision making     Hearing: Auditory  Auditory Impairment: None    Edema: mild swelling over R hip noted   Range Of Motion:  AROM: Generally decreased, functional           PROM: Generally decreased, functional           Strength:    Strength: Generally decreased, functional                    Tone & Sensation:                  Sensation: Intact                Functional Mobility:  Bed Mobility:  Rolling: Modified independent(HOB elevated, uses handrails )  Supine to Sit: Minimum assistance;Assist x1(for management of RLE)  Sit to Supine: Minimum assistance;Assist x1(for management of R LE )  Scooting: Supervision  Transfers:  Sit to Stand: Minimum assistance;Assist x1  Stand to Sit: Contact guard assistance;Assist x1                       Balance:   Sitting: Intact  Standing: Impaired; With support  Standing - Static: Fair  Standing - Dynamic : Fair;Constant support  Ambulation/Gait Training:  Distance (ft): 30 Feet (ft)(to door and back to bed )  Assistive Device: Gait belt;Walker, rolling  Ambulation - Level of Assistance: Contact guard assistance;Assist x2(for management of IV pole )     Gait Description (WDL): Exceptions to WDL  Gait Abnormalities: Decreased step clearance; Step to gait; Antalgic        Base of Support: Shift to left;Center of gravity altered  Stance: Left increased;Right decreased  Speed/Sharla: Slow;Pace decreased (<100 feet/min)  Step Length: Left shortened;Right shortened                                    Functional Measure:  Barthel Index:    Bathin  Bladder: 10  Bowels: 10  Groomin  Dressin  Feeding: 10  Mobility: 0  Stairs: 0  Toilet Use: 10  Transfer (Bed to Chair and Back): 5  Total: 60/100       The Barthel ADL Index: Guidelines  1. The index should be used as a record of what a patient does, not as a record of what a patient could do.   2. The main aim is to establish degree of independence from any help, physical or verbal, however minor and for whatever reason. 3. The need for supervision renders the patient not independent. 4. A patient's performance should be established using the best available evidence. Asking the patient, friends/relatives and nurses are the usual sources, but direct observation and common sense are also important. However direct testing is not needed. 5. Usually the patient's performance over the preceding 24-48 hours is important, but occasionally longer periods will be relevant. 6. Middle categories imply that the patient supplies over 50 per cent of the effort. 7. Use of aids to be independent is allowed. Michael Wolf., Barthel, D.W. (0158). Functional evaluation: the Barthel Index. [address] [facility] (14)2. Jonna Ambriz manisha KIERSTEN Logan, Mayra Zacarias., Eneida Kulkarni., Rice Pulse, [address] (). Measuring the change indisability after inpatient rehabilitation; comparison of the responsiveness of the Barthel Index and Functional [place] Measure. Journal of Neurology, Neurosurgery, and Psychiatry, 66(4), 482-436. Clarice Robledo, N.J.A, BELKIS Ugalde, & Noel Macedo, M.A. (2004.) Assessment of post-stroke quality of life in cost-effectiveness studies: The usefulness of the Barthel Index and the EuroQoL-5D.  Quality of Life Research, 15, 129-87            Physical Therapy Evaluation Charge Determination   History Examination Presentation Decision-Making   LOW Complexity : Zero comorbidities / personal factors that will impact the outcome / POC LOW Complexity : 1-2 Standardized tests and measures addressing body structure, function, activity limitation and / or participation in recreation  LOW Complexity : Stable, uncomplicated  LOW Complexity : FOTO score of       Based on the above components, the patient evaluation is determined to be of the following complexity level: LOW     Pain Ratin/10 supine in bed pre-exercise  7/10 supine in bed post-exercise - nursing notified     Activity Tolerance:   Fair - tolerates short distance ambulation well w/ stable vitals   Please refer to the flowsheet for vital signs taken during this treatment. After treatment patient left in no apparent distress:   Supine in bed, Call bell within reach, and Side rails x 3    COMMUNICATION/EDUCATION:   The patients plan of care was discussed with: Registered Nurse. Fall prevention education was provided and the patient/caregiver indicated understanding., Patient/family have participated as able in goal setting and plan of care. , and Patient/family agree to work toward stated goals and plan of care. Regarding student involvement in patient care:  A student participated in this treatment session. Per CMS Medicare statements and APTA guidelines I certify that the following was true:  1. I was present and directly observed the entire session. 2. I made all skilled judgments and clinical decisions regarding care. 3. I am the practitioner responsible for assessment, treatment, and documentation.       Thank you for this referral.  Mary Kate Blood

## 2020-01-29 NOTE — PROGRESS NOTES
Primary Nurse Jaime Barrientos RN and Misha Sánchez., RN performed a dual skin assessment on this patient No impairment noted  Marcos score is 21

## 2020-01-29 NOTE — PERIOP NOTES
TRANSFER - OUT REPORT:    Verbal report given to 5 S SHILPI Kimbrough(name) on Maria Teresa Fritz  being transferred to [facility](unit) for routine post - op       Report consisted of patients Situation, Background, Assessment and   Recommendations(SBAR). Time Pre op antibiotic given:0930  Anesthesia Stop time: 2804  Gray Present on Transfer to floor:n  Order for Gray on Chart:n  Discharge Prescriptions with Chart:n    Information from the following report(s) SBAR, OR Summary, Intake/Output, MAR and Cardiac Rhythm NSR was reviewed with the receiving nurse. Opportunity for questions and clarification was provided. Is the patient on 02? NO       L/Min        Other     Is the patient on a monitor? NO    Is the nurse transporting with the patient? NO    Surgical Waiting Area notified of patient's transfer from PACU? YES, via volunteer Shamika      The following personal items collected during your admission accompanied patient upon transfer:   Dental Appliance: Dental Appliances: (DENTURE PLACED IN PT'S MOUTH IN PACU)  Vision:    Hearing Aid:    Jewelry: Jewelry: None  Clothing: Clothing: (CLOTHING BAG REMAINS ON PT'S BED IN PACU)  Other Valuables:  Other Valuables: None  Valuables sent to safe:

## 2020-01-29 NOTE — PROGRESS NOTES
Resting comfortably. GEN:  NAD.  AOx3   ABD:  S/NT/ND   RLE:  Dressing C/D/I , 5/5 motor, Calf nttp (Bilat), Sensation grossly intact to light touch throughout, 1+ dp/pt pulses, foot perfused    Patient Vitals for the past 24 hrs:   Temp Pulse Resp BP SpO2   01/29/20 1533  93  147/80    01/29/20 1526  85  151/83    01/29/20 1500  86  157/86    01/29/20 1434 98.3 °F (36.8 °C) 84 16 144/83 96 %   01/29/20 1330 97.1 °F (36.2 °C) 75 16 147/82 97 %   01/29/20 1300  79 15  95 %   01/29/20 1255  81 18  96 %   01/29/20 1250  78 17  96 %   01/29/20 1245  81 20 136/66 95 %   01/29/20 1241 97.1 °F (36.2 °C)       01/29/20 1240  80 15  95 %   01/29/20 1235  82 15  96 %   01/29/20 1230  80 17 129/66 93 %   01/29/20 1225  82 18  94 %   01/29/20 1220  84 23  93 %   01/29/20 1215  82 18 128/64 95 %   01/29/20 1210  83 17  95 %   01/29/20 1205  84 16  97 %   01/29/20 1200  82 18 129/63 98 %   01/29/20 1155  82 20  98 %   01/29/20 1150  85 21  97 %   01/29/20 1145  85 22 123/64 97 %   01/29/20 1140  86 22 111/59 97 %   01/29/20 1135 97.1 °F (36.2 °C) 89 20 111/72 98 %   01/29/20 1134 97.1 °F (36.2 °C) 84 21 108/61 98 %   01/29/20 1130  91 17 108/61 99 %   01/29/20 0745 98.5 °F (36.9 °C) 83 16 156/70 (!) 83 %       Current Facility-Administered Medications   Medication Dose Route Frequency    insulin glargine (LANTUS) injection 16 Units  16 Units SubCUTAneous QHS    [START ON 1/30/2020] losartan (COZAAR) tablet 100 mg  100 mg Oral DAILY    [START ON 1/30/2020] metFORMIN (GLUCOPHAGE) tablet 1,000 mg  1,000 mg Oral BID WITH MEALS    [START ON 1/30/2020] simvastatin (ZOCOR) tablet 40 mg  40 mg Oral QHS    0.9% sodium chloride infusion  125 mL/hr IntraVENous CONTINUOUS    sodium chloride 0.9 % bolus infusion 500 mL  500 mL IntraVENous ONCE PRN    sodium chloride (NS) flush 5-40 mL  5-40 mL IntraVENous Q8H    sodium chloride (NS) flush 5-40 mL  5-40 mL IntraVENous PRN    acetaminophen (TYLENOL) tablet 650 mg  650 mg Oral Q6H    oxyCODONE IR (ROXICODONE) tablet 5 mg  5 mg Oral Q3H PRN    HYDROmorphone (PF) (DILAUDID) injection 0.5 mg  0.5 mg IntraVENous Q4H PRN    naloxone (NARCAN) injection 0.4 mg  0.4 mg IntraVENous PRN    ceFAZolin (ANCEF) 2 g/20 mL in sterile water IV syringe  2 g IntraVENous Q8H    hydroxyzine HCL (ATARAX) tablet 10 mg  10 mg Oral Q8H PRN    famotidine (PEPCID) tablet 20 mg  20 mg Oral BID    senna-docusate (PERICOLACE) 8.6-50 mg per tablet 1 Tab  1 Tab Oral BID    [START ON 1/30/2020] polyethylene glycol (MIRALAX) packet 17 g  17 g Oral DAILY    [START ON 1/30/2020] bisacodyL (DULCOLAX) suppository 10 mg  10 mg Rectal DAILY PRN    aspirin delayed-release tablet 81 mg  81 mg Oral BID    ketorolac (TORADOL) injection 15 mg  15 mg IntraVENous Q6H    ondansetron (ZOFRAN) injection 4 mg  4 mg IntraVENous Q4H PRN    traMADol (ULTRAM) tablet 50 mg  50 mg Oral Q6H PRN    insulin lispro (HUMALOG) injection   SubCUTAneous TIDAC    glucose chewable tablet 16 g  4 Tab Oral PRN    glucagon (GLUCAGEN) injection 1 mg  1 mg IntraMUSCular PRN    dextrose 10% infusion 0-250 mL  0-250 mL IntraVENous PRN       Lab Results   Component Value Date/Time    HGB 14.0 01/16/2020 08:43 AM    INR 0.9 01/16/2020 08:43 AM       Lab Results   Component Value Date/Time    Sodium 139 01/16/2020 08:43 AM    Potassium 5.4 (H) 01/16/2020 08:43 AM    Chloride 106 01/16/2020 08:43 AM    CO2 28 01/16/2020 08:43 AM    BUN 28 (H) 01/16/2020 08:43 AM    Creatinine 0.89 01/16/2020 08:43 AM    Calcium 10.2 (H) 01/16/2020 08:43 AM            60 y.o. male s/p right direct anterior total hip arthroplasty on 1/29/2020  . Doing well.      Precautions: None  ABX: Complete 24 hours perioperative abx  PATHWAY: Straight cath per protocol  DVT Prophylaxis: ASA 81 mg enteric coated BID  Weight Bearing: WBAT RLE   Pain Control: Toradol, Tylenol, 15 mg meloxicam to begin evening POD 1 if patient still in hospital, tramadol every 6 hours, oxy 5 mg for breakthrough pain  Disposition: Home, [facility]

## 2020-01-29 NOTE — ANESTHESIA PREPROCEDURE EVALUATION
Relevant Problems   No relevant active problems       Anesthetic History   No history of anesthetic complications            Review of Systems / Medical History  Patient summary reviewed, nursing notes reviewed and pertinent labs reviewed    Pulmonary  Within defined limits                 Neuro/Psych             Comments: Chronic pain (G89.29) Cardiovascular    Hypertension              Exercise tolerance: >4 METS     GI/Hepatic/Renal  Within defined limits              Endo/Other    Diabetes    Arthritis     Other Findings              Physical Exam    Airway  Mallampati: II  TM Distance: > 6 cm  Neck ROM: normal range of motion   Mouth opening: Normal     Cardiovascular  Regular rate and rhythm,  S1 and S2 normal,  no murmur, click, rub, or gallop  Rhythm: regular  Rate: normal         Dental  No notable dental hx       Pulmonary  Breath sounds clear to auscultation               Abdominal  GI exam deferred       Other Findings            Anesthetic Plan    ASA: 2  Anesthesia type: spinal and general - backup          Induction: Intravenous  Anesthetic plan and risks discussed with: Patient

## 2020-01-29 NOTE — ANESTHESIA POSTPROCEDURE EVALUATION
Procedure(s):  RIGHT TOTAL HIP ARTHROPLASTY, ANTERIOR APPROACH.    spinal, general - backup    Anesthesia Post Evaluation        Patient location during evaluation: PACU  Patient participation: complete - patient participated  Level of consciousness: awake and alert  Pain management: adequate  Airway patency: patent  Anesthetic complications: no  Cardiovascular status: acceptable  Respiratory status: acceptable  Hydration status: acceptable  Post anesthesia nausea and vomiting:  none      Vitals Value Taken Time   /66 1/29/2020 12:45 PM   Temp 36.2 °C (97.1 °F) 1/29/2020 12:41 PM   Pulse 78 1/29/2020  1:01 PM   Resp 18 1/29/2020  1:01 PM   SpO2 96 % 1/29/2020  1:01 PM   Vitals shown include unvalidated device data.

## 2020-01-30 ENCOUNTER — HOME HEALTH ADMISSION (OUTPATIENT)
Dept: HOME HEALTH SERVICES | Facility: HOME HEALTH | Age: 65
End: 2020-01-30
Payer: COMMERCIAL

## 2020-01-30 VITALS
WEIGHT: 229 LBS | OXYGEN SATURATION: 96 % | RESPIRATION RATE: 16 BRPM | BODY MASS INDEX: 35.87 KG/M2 | TEMPERATURE: 98.8 F | DIASTOLIC BLOOD PRESSURE: 68 MMHG | SYSTOLIC BLOOD PRESSURE: 130 MMHG | HEART RATE: 79 BPM

## 2020-01-30 LAB
ANION GAP SERPL CALC-SCNC: 6 MMOL/L (ref 5–15)
BUN SERPL-MCNC: 18 MG/DL (ref 6–20)
BUN/CREAT SERPL: 20 (ref 12–20)
CALCIUM SERPL-MCNC: 8.8 MG/DL (ref 8.5–10.1)
CHLORIDE SERPL-SCNC: 107 MMOL/L (ref 97–108)
CO2 SERPL-SCNC: 25 MMOL/L (ref 21–32)
CREAT SERPL-MCNC: 0.88 MG/DL (ref 0.7–1.3)
GLUCOSE BLD STRIP.AUTO-MCNC: 129 MG/DL (ref 65–100)
GLUCOSE BLD STRIP.AUTO-MCNC: 172 MG/DL (ref 65–100)
GLUCOSE SERPL-MCNC: 182 MG/DL (ref 65–100)
HCT VFR BLD AUTO: 39.6 % (ref 36.6–50.3)
HGB BLD-MCNC: 13 G/DL (ref 12.1–17)
POTASSIUM SERPL-SCNC: 4 MMOL/L (ref 3.5–5.1)
SERVICE CMNT-IMP: ABNORMAL
SERVICE CMNT-IMP: ABNORMAL
SODIUM SERPL-SCNC: 138 MMOL/L (ref 136–145)

## 2020-01-30 PROCEDURE — 97165 OT EVAL LOW COMPLEX 30 MIN: CPT

## 2020-01-30 PROCEDURE — 74011250637 HC RX REV CODE- 250/637: Performed by: ORTHOPAEDIC SURGERY

## 2020-01-30 PROCEDURE — 36415 COLL VENOUS BLD VENIPUNCTURE: CPT

## 2020-01-30 PROCEDURE — 96374 THER/PROPH/DIAG INJ IV PUSH: CPT

## 2020-01-30 PROCEDURE — 82962 GLUCOSE BLOOD TEST: CPT

## 2020-01-30 PROCEDURE — 80048 BASIC METABOLIC PNL TOTAL CA: CPT

## 2020-01-30 PROCEDURE — 97535 SELF CARE MNGMENT TRAINING: CPT

## 2020-01-30 PROCEDURE — 74011250636 HC RX REV CODE- 250/636: Performed by: ORTHOPAEDIC SURGERY

## 2020-01-30 PROCEDURE — 99218 HC RM OBSERVATION: CPT

## 2020-01-30 PROCEDURE — 85018 HEMOGLOBIN: CPT

## 2020-01-30 PROCEDURE — 74011636637 HC RX REV CODE- 636/637: Performed by: ORTHOPAEDIC SURGERY

## 2020-01-30 PROCEDURE — 97116 GAIT TRAINING THERAPY: CPT

## 2020-01-30 RX ORDER — AMOXICILLIN 250 MG
1 CAPSULE ORAL 2 TIMES DAILY
Qty: 30 TAB | Refills: 0 | Status: SHIPPED | OUTPATIENT
Start: 2020-01-30

## 2020-01-30 RX ADMIN — KETOROLAC TROMETHAMINE 15 MG: 30 INJECTION, SOLUTION INTRAMUSCULAR at 07:04

## 2020-01-30 RX ADMIN — Medication 2 G: at 02:29

## 2020-01-30 RX ADMIN — POLYETHYLENE GLYCOL 3350 17 G: 17 POWDER, FOR SOLUTION ORAL at 08:30

## 2020-01-30 RX ADMIN — INSULIN LISPRO 2 UNITS: 100 INJECTION, SOLUTION INTRAVENOUS; SUBCUTANEOUS at 12:20

## 2020-01-30 RX ADMIN — ACETAMINOPHEN 650 MG: 325 TABLET ORAL at 07:04

## 2020-01-30 RX ADMIN — TRAMADOL HYDROCHLORIDE 50 MG: 50 TABLET, FILM COATED ORAL at 14:39

## 2020-01-30 RX ADMIN — KETOROLAC TROMETHAMINE 15 MG: 30 INJECTION, SOLUTION INTRAMUSCULAR at 14:39

## 2020-01-30 RX ADMIN — ACETAMINOPHEN 650 MG: 325 TABLET ORAL at 02:30

## 2020-01-30 RX ADMIN — ACETAMINOPHEN 650 MG: 325 TABLET ORAL at 14:39

## 2020-01-30 RX ADMIN — ASPIRIN 81 MG: 81 TABLET, COATED ORAL at 08:29

## 2020-01-30 RX ADMIN — KETOROLAC TROMETHAMINE 15 MG: 30 INJECTION, SOLUTION INTRAMUSCULAR at 02:30

## 2020-01-30 RX ADMIN — FAMOTIDINE 20 MG: 20 TABLET ORAL at 08:30

## 2020-01-30 RX ADMIN — METFORMIN HYDROCHLORIDE 1000 MG: 500 TABLET ORAL at 08:30

## 2020-01-30 RX ADMIN — SODIUM CHLORIDE 125 ML/HR: 900 INJECTION, SOLUTION INTRAVENOUS at 07:07

## 2020-01-30 RX ADMIN — LOSARTAN POTASSIUM 100 MG: 50 TABLET, FILM COATED ORAL at 08:30

## 2020-01-30 NOTE — PROGRESS NOTES
Physical Therapy 1.30.20    Pt seen by PT and cleared on stairs for home. Full note to follow. RN aware. Update at 2210: OT evaluated pt and pt with orthostatic hypotension requiring to return to bed. Will continue to follow. Thank you.   Eloisa Sandoval, PT, DPT

## 2020-01-30 NOTE — PROGRESS NOTES
CM received notification of downgrade to observation. CM delivered observation letter. Per Zhanna Bocanegra with utilization review, no code 40 is needed for this payor source.     BRENNA Sotelo/CRM

## 2020-01-30 NOTE — PROGRESS NOTES
Physical Therapy 1/30/2020 @1300    Pt cleared on stairs by PT this AM. Pt with orthostatic hypotension with OT this AM. Checked on pt this PM, pt dressed ready for home reporting improvements in symptoms. Pt states no concerns for mobility or home. Will follow-up as appropriate. Recommend with nursing patient to complete as able in order to maintain strength, endurance and independence: OOB to chair 3x/day with SBA and ambulating with CGA x RW. Thank you.   Arjun Don, PT, DPT

## 2020-01-30 NOTE — PROGRESS NOTES
Problem: Mobility Impaired (Adult and Pediatric)  Goal: *Acute Goals and Plan of Care (Insert Text)  Description  FUNCTIONAL STATUS PRIOR TO ADMISSION: Patient was independent and active without use of DME.    HOME SUPPORT PRIOR TO ADMISSION: The patient lived with wife but did not require assist.    Physical Therapy Goals  Initiated 1/29/2020    1. Patient will move from supine to sit and sit to supine  in bed with modified independence within 4 days. 2. Patient will perform sit to stand with minimal assistance/contact guard assist within 4 days. 3. Patient will ambulate with minimal assistance/contact guard assist for 150 feet with the least restrictive device within 4 days. 4. Patient will ascend/descend 2 stairs with bilateral handrail(s) with minimal assistance/contact guard assist within 4 days. 5. Patient will perform ELIANA home exercise program per protocol with independence within 4 days. Outcome: Progressing Towards Goal    PHYSICAL THERAPY TREATMENT  Patient: Christie Lazar (47 y.o. male)  Date: 1/30/2020  Diagnosis: Primary osteoarthritis of right hip [M16.11]   <principal problem not specified>  Procedure(s) (LRB):  RIGHT TOTAL HIP ARTHROPLASTY, ANTERIOR APPROACH (Right) 1 Day Post-Op  Precautions: WBAT, Total hip, Other (comment)(RLE)  Chart, physical therapy assessment, plan of care and goals were reviewed. ASSESSMENT  Patient continues with skilled PT services and is progressing towards goals. Patient required increased time to transition toward EOB 2/2 RLE pain. Pt ascended from chair requiring initial cues for hand position for transfers with good carryover. Pt ambulated with guarded posture and decreased RLE WB 2/2 pain. Pt ascended/descended 4 steps with step to pattern and fair steadiness. Pt ambulated back to room with increased time, yet no LOB or buckling of knees noted. Pt transferred to chair and educated on exercises. Pt cleared on stairs for home by PT. RN aware.     Current Level of Function Impacting Discharge (mobility/balance): CGA for transfers, ambulation, and stairs    Other factors to consider for discharge: 2 MEMO, will have assistance from DTRs         PLAN :  Patient continues to benefit from skilled intervention to address the above impairments. Continue treatment per established plan of care. to address goals. Recommendation for discharge: (in order for the patient to meet his/her long term goals)  Physical therapy at least 2 days/week in the home     This discharge recommendation:  Has been made in collaboration with the attending provider and/or case management    IF patient discharges home will need the following DME: patient owns DME required for discharge       SUBJECTIVE:   Patient stated my right knee is starting to hurt.     OBJECTIVE DATA SUMMARY:   Critical Behavior:  Neurologic State: Appropriate for age  Orientation Level: Oriented X4  Cognition: Appropriate for age attention/concentration, Appropriate decision making, Appropriate safety awareness, Follows commands     Functional Mobility Training:  Bed Mobility:     Supine to Sit: Supervision;Bed Modified; Additional time     Scooting: Supervision        Transfers:  Sit to Stand: Contact guard assistance; Adaptive equipment; Additional time  Stand to Sit: Contact guard assistance; Adaptive equipment; Additional time    Balance:  Sitting: Intact  Standing: Impaired; With support  Standing - Static: Good  Standing - Dynamic : Fair  Ambulation/Gait Training:  Distance (ft): 75 Feet (ft)(x2)  Assistive Device: Gait belt;Walker, rolling  Ambulation - Level of Assistance: Contact guard assistance; Adaptive equipment; Additional time        Gait Abnormalities: Decreased step clearance; Antalgic        Base of Support: Shift to left  Stance: Right decreased  Speed/Sharla: Pace decreased (<100 feet/min)  Step Length: Left shortened  Swing Pattern: Left asymmetrical;Right asymmetrical          Stairs:  Number of Stairs Trained: 4  Stairs - Level of Assistance: Contact guard assistance; Adaptive equipment; Additional time   Rail Use: Both    Therapeutic Exercises:   Reviewed seated ankle pumps, Deferred further exercise 2/2 pain    Pain Rating:  Pt did not quantify R hip pain    Activity Tolerance:   Good  Please refer to the flowsheet for vital signs taken during this treatment.     After treatment patient left in no apparent distress:   Sitting in chair and Call bell within reach    COMMUNICATION/COLLABORATION:   The patients plan of care was discussed with: Occupational Therapist and Registered Nurse    Shabana España PT, DPT   Time Calculation: 26 mins

## 2020-01-30 NOTE — PROGRESS NOTES
Bedside and Verbal shift change report given to Cecilia Rodríguez (oncoming nurse) by Teresa (offgoing nurse). Report included the following information SBAR.

## 2020-01-30 NOTE — PROGRESS NOTES
TAMMY: home with home health. Patient will stay at daughter's home in [facility] for a few days before returning to [place]. [facility] has accepted patient. Care Management Interventions  PCP Verified by CM: Yes  Mode of Transport at Discharge: Other (see comment)(family/car)  Transition of Care Consult (CM Consult): [facility] [address]: No  MyChart Signup: No  Discharge Durable Medical Equipment: No(patient owns walker)  Physical Therapy Consult: Yes  Occupational Therapy Consult: Yes  Speech Therapy Consult: No  Current Support Network: Relative's Home, Own Home  Confirm Follow Up Transport: Family  The Plan for Transition of Care is Related to the Following Treatment Goals : home health  The Patient and/or Patient Representative was Provided with a Choice of Provider and Agrees with the Discharge Plan?: Yes  Freedom of Choice List was Provided with Basic Dialogue that Supports the Patient's Individualized Plan of Care/Goals, Treatment Preferences and Shares the Quality Data Associated with the Providers?: Yes  Discharge Location  Discharge Placement: Home with home health     Reason for Admission:  RIGHT TOTAL HIP ARTHROPLASTY, ANTERIOR APPROACH                     RUR Score:  5%                   Plan for utilizing home health:    Patient prefers [facility] [address]: not on file                         Transition of Care Plan:  Home with home health. Daughter's address is [place] [address]. [address].      BRENNA Delcid/CARMEN

## 2020-01-30 NOTE — PROGRESS NOTES
Problem: Self Care Deficits Care Plan (Adult)  Goal: *Acute Goals and Plan of Care (Insert Text)  Description  Occupational Therapy Goals  Initiated: 1/30/2020   1. Patient will perform grooming with supervision/set-up standing at sink within 3 day(s). 2.  Patient will perform bathing with supervision/set-up from chair within 3 day(s). 3.  Patient will perform upper body dressing and lower body dressing with supervision/set-up within 3 day(s). 4.  Patient will perform toilet transfers with supervision/set-up within 3 day(s). 5.  Patient will perform all aspects of toileting with supervision/set-up within 3 day(s). FUNCTIONAL STATUS PRIOR TO ADMISSION: independent prior to admission. HOME SUPPORT: Has support and help for home as needed   Outcome: Progressing Towards Goal     OCCUPATIONAL THERAPY EVALUATION  Patient: Kenny Jenkins (77 y.o. male)  Date: 1/30/2020  Primary Diagnosis: Primary osteoarthritis of right hip [M16.11]  Primary osteoarthritis of right hip [M16.11]  Procedure(s) (LRB):  RIGHT TOTAL HIP ARTHROPLASTY, ANTERIOR APPROACH (Right) 1 Day Post-Op   Precautions:   Fall, WBAT    ASSESSMENT  Based on the objective data described below, the patient presents with decreased independence with self care and functional mobility following admission for R THR. Pt received in chair with c/o dizziness. BP assessed and noted 94/58. Pt also sweating slightly. He did participate with dressing activities from chair level and then returned to supine in bed. Pt's BP recovered with supine and symptoms resolved quickly. Pt sat in chair for 20 minutes total per his estimate. At this time, unable to ambulate pt to and from bathroom or complete grooming activities standing. Recommend nursing to assisted with OOB to chair for meals and to progress to and bathroom if asymptomatic. Discussed with RN and PT. Will follow for further ADL training if pt does not discharge today.      Current Level of Function Impacting Discharge (ADLs/self-care): min A for LB dressing    Functional Outcome Measure: The patient scored 60 on the Barthel Index outcome measure which is indicative of minimal impairment with ADL function. Other factors to consider for discharge: orthostasis. Patient will benefit from skilled therapy intervention to address the above noted impairments. PLAN :  Recommendations and Planned Interventions: self care training, functional mobility training, therapeutic exercise, balance training, therapeutic activities, endurance activities, patient education, home safety training, and family training/education    Frequency/Duration: Patient will be followed by occupational therapy 5 times a week to address goals. Recommendation for discharge: (in order for the patient to meet his/her long term goals)  No skilled occupational therapy/ follow up rehabilitation needs identified at this time. This discharge recommendation:  Has been made in collaboration with the attending provider and/or case management    IF patient discharges home will need the following DME: has hip kit for home       SUBJECTIVE:   Patient stated I am dizzy. It started after sitting her for a few minutes.     OBJECTIVE DATA SUMMARY:   HISTORY:   Past Medical History:   Diagnosis Date    Arthritis     Chronic pain     RIGHT HIP    Diabetes ([facility] [address]     IDDM    Hypertension      Past Surgical History:   Procedure Laterality Date    HX COLONOSCOPY  2014    HX KNEE ARTHROSCOPY Right 1980'S       Expanded or extensive additional review of patient history:     Home Situation  Home Environment: Private residence  # Steps to Enter: 2  Rails to Enter: Yes  Hand Rails : Bilateral  One/Two Story Residence: One story  Living Alone: No  Support Systems: Spouse/Significant Other/Partner  Patient Expects to be Discharged to[de-identified] Private residence  Current DME Used/Available at Home: Walker    Hand dominance: Right    EXAMINATION OF PERFORMANCE DEFICITS:  Cognitive/Behavioral Status:  Neurologic State: Alert  Orientation Level: Oriented X4  Cognition: Appropriate for age attention/concentration  Perception: Appears intact  Perseveration: No perseveration noted  Safety/Judgement: Good awareness of safety precautions    Skin: see nursing notes    Edema: none noted    Hearing: Auditory  Auditory Impairment: None    Vision/Perceptual:                                     Range of Motion:    AROM: Within functional limits  PROM: Within functional limits                      Strength:  Strength: Within functional limits                Coordination:  Coordination: Within functional limits  Fine Motor Skills-Upper: Right Intact; Left Intact    Gross Motor Skills-Upper: Right Intact; Left Intact    Tone & Sensation:    Tone: Normal  Sensation: Intact                      Balance:  Sitting: Intact  Standing: Intact; With support  Standing - Static: Fair  Standing - Dynamic : Fair    Functional Mobility and Transfers for ADLs:  Bed Mobility:  Supine to Sit: (recieved sitting in chair)  Sit to Supine: Contact guard assistance  Scooting: Supervision    Transfers:  Sit to Stand: Contact guard assistance  Stand to Sit: Contact guard assistance  Bed to Chair: Contact guard assistance  Bathroom Mobility: Contact guard assistance  Toilet Transfer : Contact guard assistance    ADL Assessment:  Feeding: Supervision    Oral Facial Hygiene/Grooming: Supervision    Bathing:  Moderate assistance    Upper Body Dressing: Supervision    Lower Body Dressing: Minimum assistance    Toileting: Supervision                ADL Intervention and task modifications:                                     Cognitive Retraining  Safety/Judgement: Good awareness of safety precautions    Functional Measure:  Barthel Index:    Bathin  Bladder: 10  Bowels: 10  Groomin  Dressin  Feeding: 10  Mobility: 5  Stairs: 0  Toilet Use: 5  Transfer (Bed to Chair and Back): 10  Total: 60/100 The Barthel ADL Index: Guidelines  1. The index should be used as a record of what a patient does, not as a record of what a patient could do. 2. The main aim is to establish degree of independence from any help, physical or verbal, however minor and for whatever reason. 3. The need for supervision renders the patient not independent. 4. A patient's performance should be established using the best available evidence. Asking the patient, friends/relatives and nurses are the usual sources, but direct observation and common sense are also important. However direct testing is not needed. 5. Usually the patient's performance over the preceding 24-48 hours is important, but occasionally longer periods will be relevant. 6. Middle categories imply that the patient supplies over 50 per cent of the effort. 7. Use of aids to be independent is allowed. Indy Curiel., Barthel, KEITH. (2219). Functional evaluation: the Barthel Index. [facility] (14)2. Jeanna Abad manisha KIERSTEN Logan, Dominga Kerr, Alexis Carrington., [place], [address] [place] [address] (1999). Measuring the change indisability after inpatient rehabilitation; comparison of the responsiveness of the Barthel Index and Functional [place] Measure. Journal of Neurology, Neurosurgery, and Psychiatry, 66(4), 770-290. RACHANA Valencia.ADRIEN, BELKIS Ugalde, & Margaret Ly MJEAN-PAUL. (2004.) Assessment of post-stroke quality of life in cost-effectiveness studies: The usefulness of the Barthel Index and the EuroQoL-5D.  Quality of Life Research, 15, 187-91         Occupational Therapy Evaluation Charge Determination   History Examination Decision-Making   LOW Complexity : Brief history review  LOW Complexity : 1-3 performance deficits relating to physical, cognitive , or psychosocial skils that result in activity limitations and / or participation restrictions  LOW Complexity : No comorbidities that affect functional and no verbal or physical assistance needed to complete eval tasks Based on the above components, the patient evaluation is determined to be of the following complexity level: LOW   Pain Rating:  No pain reported when asked    Activity Tolerance:   Patient Vitals for the past 4 hrs:   Pulse BP   01/30/20 0946 79 130/68   01/30/20 0937 84 94/58         Please refer to the flowsheet for vital signs taken during this treatment. After treatment patient left in no apparent distress:    Remained supine in bed with call light within reach    COMMUNICATION/EDUCATION:   The patients plan of care was discussed with: Physical Therapist and Registered Nurse. Home safety education was provided and the patient/caregiver indicated understanding. and Patient/family have participated as able in goal setting and plan of care. This patients plan of care is appropriate for delegation to hospitals.     Thank you for this referral.  Cheli Sykes OT  Time Calculation: 19 mins

## 2020-01-30 NOTE — PROGRESS NOTES
Bedside and Verbal shift change report given to SHILPI Zaidi (oncoming nurse) by Luis Alberto Mojica RN (offgoing nurse). Report included the following information SBAR, Kardex, OR Summary, Procedure Summary, Intake/Output, MAR and Recent Results.

## 2020-01-30 NOTE — PHYSICIAN ADVISORY
[facility] Physician Advisor Recommendation The information in this document is a recommendation to be used for utilization review and utilization management purposes only. This recommendation is not an order. The recommendation is made based on the information reviewed at the time of the referral, is pursuant to the [place] Conditions of Participation (42 CFR Part 482), and is neither a judgment nor an assessment with regard to the appropriateness or quality of clinical care. Nothing in this document may be used to limit, alter, or affect clinical services provided to the patient named below. The provider of services is ultimately responsible for the submission of a claim that has met all requirements for correct coding, billing, and reimbursement. Letter of Status Determination:   
Recommend Changing patient status from INPATIENT to OBSERVATION Pt Name:  Yvonne Montalvo MR#  [id number] [facility]#   [id number] [facility]  563/01  @ [facility]  
Hospitalization date  1/29/2020  7:11 AM  
Current Attending Physician  Yulia Simon MD  
Principal diagnosis  <principal problem not specified> Clinicals  60 y.o. male hospitalized with RIGHT TOTAL HIP ARTHROPLASTY, AN[facility] criteria Does  NOT  apply STATUS DETERMINATION  On the basis of review of available clinical data, documentation in the chart  It is our recommendation that the patient's status is changed from INPATIENT to OBSERVATION The Documentation in the medical record  does not supports the admitting physician's determination that the patient required inpatient care despite the lack of a 2-midnight expectation based upon complex medical factors including but not limited to: Current medical condition and needs , Patient's history, co-morbidities ,Risk of adverse events  and severity of signs and symptoms Additional comments Insurance  Payor: Branden Escobar / Plan: oJanie Cunningham / Product Type: Commerical /   
 
  
 
Ubaldo Marie MD, MARIELA Physician [address] Insurance Information GENERIC COMMERCIAL/BSHSI GENERIC COMMERCIAL Phone: [phone] Subscriber: Wilma Garcia Subscriber#: [id number]  Group#: [id number] Precert#:

## 2020-01-30 NOTE — DISCHARGE INSTRUCTIONS
Discharge Instructions Anterior Approach   Hip Replacement-Dr. Immanuel Saldana  Patient Name: Cassandra Kolb  Date of procedure:1/29/2020                                   Procedure:Procedure(s):  RIGHT TOTAL HIP ARTHROPLASTY, ANTERIOR APPROACH  Surgeon:Surgeon(s) and Role:     * Amanda Parry MD - Primary               PCP: Nohemi Prabhakar NP  Date of discharge: 1/ 30/2020                  Follow up appointments   Follow up with Dr. Immanuel Saldana in 4 weeks. Call [phone] extension [phone] Ern[address] Meraz) to make an appointment.  If home health has been arranged for you the agency will contact you to arrange dates/times for visits. Please call them if you do not hear from them within 24 hours after you are discharged. When to call your Orthopaedic Surgeon: Call [phone]. If you need to reach us after 5pm or on a weekend call [phone] and the on call physician will be contacted.  Increased hip pain; unrelieved pain or if you have difficulty or are unable to walk   Signs of infection-if your incision is red; continues to have drainage; drainage has a foul odor or if you have a persistent fever over 101 degrees   Signs of a blood clot in your leg-calf pain, tenderness, redness, swelling of lower leg  When to call your Primary Care Physician:   Concerns about medical conditions such as diabetes, high blood pressure, asthma, congestive heart failure   Call if blood sugars are elevated, persistent headache or dizziness, coughing or congestion, constipation or diarrhea, burning with urination, abnormal heart rate     When to call [id number] go to the nearest emergency room   Sudden onset of chest pain, shortness of breath, difficulty breathing     Activity   Weight bearing as tolerated with walker or crutches.  Refer to your handbook for instructions and pictures   Complete your Home Exercise Program daily as instructed by the physical therapist.  Refer to your handbook for instructions and pictures   Get up every one hour and walk (except at night when sleeping)   AVOID sudden and extreme movement of hip to prevent dislocation   Do not drive or operate heavy machinery    Incision Care   The Aquacel (brown, waterproof) surgical dressing is to remain on your hip for 7 days. On the 7th day have someone gently peel the dressing off by carefully lifting the edge and stretching it slightly to break the adhesive seal and leave incision open to air. You may take a shower with the Aquacel dressing in place   Once the Aquacel is removed, you may get your incision wet but do not submerge your incision under water in a bath tub, hot tub or swimming pool for 8 weeks after surgery. Preventing blood clots    Take aspirin 81 mg twice daily to prevent blood clots. Pain management   Please take scheduled 650 mg tylenol every 6 hours for 6 weeks   Please take scheduled meloxicam 7.5 mg daily for 6 weeks to decrease swelling, pain, and inflammation   Please take tramadol every 6 hours for pain as needed for pain.  You will be given a prescription for  tramadol   Tramadol can cause nausea and constipation- so please use sparingly and you may stop use as soon as your pain is reasonably controlled      While taking aspirin and meloxicam, please take famotidine (PEPCID) 20 mg twice daily as prescribed to prevent stomach ulcers/irritation.  If you have a history of stomach ulcers, do not take meloxicam.      Avoid alcoholic beverages while taking pain medication   Do not take any over-the-counter medication for pain except Tylenol (acetaminophen)   Keep ice wrap in place except when walking.  Change gel packs every 4 hours   Lie down and elevate your leg on pillows for about 30 minutes after walking to decrease swelling and pain       Diet   Resume usual diet; drink plenty of fluids; eat foods high in fiber   Please take a stool softener (such as Senokot-S or Colace) to prevent constipation while you are taking oxycodone. If constipation occurs, take a laxative (such as Dulcolax tablets, Milk of Magnesia, or a suppository).

## 2020-01-30 NOTE — PROGRESS NOTES
Ortho Daily Progress Note    1/30/2020  11:46 AM    POD:  1 Day Post-Op  S/P:  Procedure(s):  RIGHT TOTAL HIP ARTHROPLASTY, ANTERIOR APPROACH    Afebrile/VSS, NAD, A&O x 3  Doing well without complaints of nausea  Pain well controlled  Calves soft/NTTP Bilaterally  Thigh soft. Dressing clean and dry  Moving lower extremities well. Neurocirculatory exam intact and within normal range. Lab Results   Component Value Date/Time    HGB 13.0 01/30/2020 02:45 AM    INR 0.9 01/16/2020 08:43 AM     Recent Labs     01/30/20  0245 01/16/20  0843   CREA 0.88 0.89   BUN 18 28*     Estimated Creatinine Clearance: 100 mL/min (based on SCr of 0.88 mg/dL).     PLAN:  DVT prophylaxis: ASA 81 mg bid  WBAT with PT-mobilization  Pain Control: tylenol, mobic, tramdol  Plan to D/C home with HH/PT today      SHEELA Horne

## 2020-01-31 ENCOUNTER — HOME CARE VISIT (OUTPATIENT)
Dept: SCHEDULING | Facility: HOME HEALTH | Age: 65
End: 2020-01-31
Payer: COMMERCIAL

## 2020-01-31 VITALS
TEMPERATURE: 98 F | HEART RATE: 73 BPM | OXYGEN SATURATION: 97 % | DIASTOLIC BLOOD PRESSURE: 79 MMHG | SYSTOLIC BLOOD PRESSURE: 124 MMHG | RESPIRATION RATE: 17 BRPM

## 2020-01-31 PROCEDURE — G0151 HHCP-SERV OF PT,EA 15 MIN: HCPCS

## 2020-01-31 PROCEDURE — 400013 HH SOC

## 2020-01-31 NOTE — PROGRESS NOTES
I have reviewed discharge instructions with the patient and spouse. The patient and spouse verbalized understanding and had the opportunity to ask questions. Patient left with all belongings including clothes, cell phone and paper prescriptions.

## 2020-02-04 ENCOUNTER — HOME CARE VISIT (OUTPATIENT)
Dept: SCHEDULING | Facility: HOME HEALTH | Age: 65
End: 2020-02-04
Payer: COMMERCIAL

## 2020-02-04 VITALS
TEMPERATURE: 98.8 F | HEART RATE: 81 BPM | DIASTOLIC BLOOD PRESSURE: 60 MMHG | SYSTOLIC BLOOD PRESSURE: 117 MMHG | OXYGEN SATURATION: 98 %

## 2020-02-04 PROCEDURE — G0157 HHC PT ASSISTANT EA 15: HCPCS

## 2020-02-07 ENCOUNTER — HOME CARE VISIT (OUTPATIENT)
Dept: SCHEDULING | Facility: HOME HEALTH | Age: 65
End: 2020-02-07
Payer: COMMERCIAL

## 2020-02-07 VITALS
OXYGEN SATURATION: 96 % | SYSTOLIC BLOOD PRESSURE: 121 MMHG | HEART RATE: 81 BPM | TEMPERATURE: 98 F | DIASTOLIC BLOOD PRESSURE: 63 MMHG

## 2020-02-07 PROCEDURE — G0157 HHC PT ASSISTANT EA 15: HCPCS

## 2020-02-10 ENCOUNTER — HOME CARE VISIT (OUTPATIENT)
Dept: SCHEDULING | Facility: HOME HEALTH | Age: 65
End: 2020-02-10
Payer: COMMERCIAL

## 2020-02-10 PROCEDURE — G0157 HHC PT ASSISTANT EA 15: HCPCS

## 2020-02-11 VITALS
HEART RATE: 82 BPM | SYSTOLIC BLOOD PRESSURE: 127 MMHG | TEMPERATURE: 98.1 F | DIASTOLIC BLOOD PRESSURE: 67 MMHG | OXYGEN SATURATION: 97 %

## 2020-02-12 ENCOUNTER — HOME CARE VISIT (OUTPATIENT)
Dept: SCHEDULING | Facility: HOME HEALTH | Age: 65
End: 2020-02-12
Payer: COMMERCIAL

## 2020-02-12 VITALS
OXYGEN SATURATION: 97 % | TEMPERATURE: 98.4 F | SYSTOLIC BLOOD PRESSURE: 132 MMHG | HEART RATE: 78 BPM | DIASTOLIC BLOOD PRESSURE: 69 MMHG

## 2020-02-12 PROCEDURE — G0157 HHC PT ASSISTANT EA 15: HCPCS

## 2020-02-19 ENCOUNTER — HOME CARE VISIT (OUTPATIENT)
Dept: SCHEDULING | Facility: HOME HEALTH | Age: 65
End: 2020-02-19
Payer: COMMERCIAL

## 2020-02-19 PROCEDURE — G0151 HHCP-SERV OF PT,EA 15 MIN: HCPCS

## 2020-02-20 VITALS
RESPIRATION RATE: 12 BRPM | DIASTOLIC BLOOD PRESSURE: 60 MMHG | SYSTOLIC BLOOD PRESSURE: 123 MMHG | HEART RATE: 80 BPM | OXYGEN SATURATION: 98 % | TEMPERATURE: 97.2 F

## 2020-02-21 ENCOUNTER — HOME CARE VISIT (OUTPATIENT)
Dept: SCHEDULING | Facility: HOME HEALTH | Age: 65
End: 2020-02-21
Payer: COMMERCIAL

## 2020-02-21 PROCEDURE — G0151 HHCP-SERV OF PT,EA 15 MIN: HCPCS

## 2020-02-24 VITALS
TEMPERATURE: 97.8 F | OXYGEN SATURATION: 98 % | DIASTOLIC BLOOD PRESSURE: 75 MMHG | RESPIRATION RATE: 16 BRPM | HEART RATE: 78 BPM | SYSTOLIC BLOOD PRESSURE: 136 MMHG

## (undated) DEVICE — NEEDLE HYPO 18GA L1.5IN PNK S STL HUB POLYPR SHLD REG BVL

## (undated) DEVICE — GOWN,SIRUS,NONRNF,SETINSLV,2XL,18/CS: Brand: MEDLINE

## (undated) DEVICE — COVER,MAYO STAND,STERILE: Brand: MEDLINE

## (undated) DEVICE — SUTURE ETHBND EXCEL SZ 2 L30IN NONABSORBABLE GRN L40MM V-37 MX69G

## (undated) DEVICE — BLADE SAW W11.2XL77.5MM THK0.76MM CUT THK1.17MM REPL RECIP

## (undated) DEVICE — STERILE POLYISOPRENE POWDER-FREE SURGICAL GLOVES: Brand: PROTEXIS

## (undated) DEVICE — SUTURE STRATAFIX SYMMETRIC PDS + SZ 1 L18IN ABSRB VLT L48MM SXPP1A400

## (undated) DEVICE — BLADE ELECTRODE: Brand: EDGE

## (undated) DEVICE — T4 HOOD

## (undated) DEVICE — 3M™ STERI-DRAPE™ U-DRAPE 1015: Brand: STERI-DRAPE™

## (undated) DEVICE — SCRUB DRY SURG EZ SCRUB BRUSH PREOPERATIVE GRN

## (undated) DEVICE — HANDPIECE SET WITH BONE CLEANING TIP AND SUCTION TUBE: Brand: INTERPULSE

## (undated) DEVICE — DRSG AQUACEL SURG 3.5 X 10IN -- CONVERT TO ITEM 370183

## (undated) DEVICE — 3.8MM DIA DRILL BIT

## (undated) DEVICE — SUTURE VCRL SZ 2-0 L36IN ABSRB UD L40MM CT 1/2 CIR J957H

## (undated) DEVICE — SYR 20ML LL STRL LF --

## (undated) DEVICE — ERASECAUTI INTERMIT TRAY: Brand: MEDLINE INDUSTRIES, INC.

## (undated) DEVICE — DERMABOND SKIN ADH 0.7ML -- DERMABOND ADVANCED 12/BX

## (undated) DEVICE — SUTURE MCRYL SZ 3-0 L27IN ABSRB UD L24MM PS-1 3/8 CIR PRIM Y936H

## (undated) DEVICE — PADDING CAST SPEC 6INX4YD COT --

## (undated) DEVICE — SOLUTION IRRIG 3000ML 0.9% SOD CHL FLX CONT 0797208] ICU MEDICAL INC]

## (undated) DEVICE — HANDLE LT SNAP ON ULT DURABLE LENS FOR TRUMPF ALC DISPOSABLE

## (undated) DEVICE — SOLUTION IRRIG 1000ML H2O STRL BLT

## (undated) DEVICE — STRAP,POSITIONING,KNEE/BODY,FOAM,4X60": Brand: MEDLINE

## (undated) DEVICE — TOTAL TRAY, 16FR 10ML SIL FOLEY, URN: Brand: MEDLINE

## (undated) DEVICE — CLIP LIG ADH PD HORZ MED BLU --

## (undated) DEVICE — INFECTION CONTROL KIT SYS

## (undated) DEVICE — DRAPE XR C ARM 41X74IN LF --

## (undated) DEVICE — SYR LR LCK 1ML GRAD NSAF 30ML --

## (undated) DEVICE — NEEDLE HYPO 21GA L1.5IN INTRAMUSCULAR S STL LATCH BVL UP

## (undated) DEVICE — CONTAINER,SPECIMEN,3OZ,OR STRL: Brand: MEDLINE

## (undated) DEVICE — REM POLYHESIVE ADULT PATIENT RETURN ELECTRODE: Brand: VALLEYLAB

## (undated) DEVICE — (D)PREP SKN CHLRAPRP APPL 26ML -- CONVERT TO ITEM 371833

## (undated) DEVICE — 6619 2 PTNT ISO SYS INCISE AREA&LT;(&GT;&&LT;)&GT;P: Brand: STERI-DRAPE™ IOBAN™ 2

## (undated) DEVICE — Device

## (undated) DEVICE — PREP SKN PREVAIL 40ML APPL --

## (undated) DEVICE — STERILE POLYISOPRENE POWDER-FREE SURGICAL GLOVES WITH EMOLLIENT COATING: Brand: PROTEXIS

## (undated) DEVICE — 4-PORT MANIFOLD: Brand: NEPTUNE 2

## (undated) DEVICE — SPONGE GZ W4XL4IN COT 12 PLY TYP VII WVN C FLD DSGN

## (undated) DEVICE — SUTURE VCRL 1 L27IN ABSRB CT BRAID COAT UD J281H